# Patient Record
Sex: MALE
[De-identification: names, ages, dates, MRNs, and addresses within clinical notes are randomized per-mention and may not be internally consistent; named-entity substitution may affect disease eponyms.]

---

## 2019-06-07 PROBLEM — Z00.00 ENCOUNTER FOR PREVENTIVE HEALTH EXAMINATION: Status: ACTIVE | Noted: 2019-06-07

## 2019-06-13 ENCOUNTER — APPOINTMENT (OUTPATIENT)
Dept: OTOLARYNGOLOGY | Facility: CLINIC | Age: 34
End: 2019-06-13
Payer: MEDICAID

## 2019-06-13 VITALS
WEIGHT: 165 LBS | TEMPERATURE: 98 F | HEART RATE: 65 BPM | DIASTOLIC BLOOD PRESSURE: 76 MMHG | SYSTOLIC BLOOD PRESSURE: 118 MMHG | HEIGHT: 69 IN | BODY MASS INDEX: 24.44 KG/M2 | OXYGEN SATURATION: 95 %

## 2019-06-13 DIAGNOSIS — Z87.09 PERSONAL HISTORY OF OTHER DISEASES OF THE RESPIRATORY SYSTEM: ICD-10-CM

## 2019-06-13 DIAGNOSIS — Z78.9 OTHER SPECIFIED HEALTH STATUS: ICD-10-CM

## 2019-06-13 DIAGNOSIS — F17.210 NICOTINE DEPENDENCE, CIGARETTES, UNCOMPLICATED: ICD-10-CM

## 2019-06-13 DIAGNOSIS — Z83.3 FAMILY HISTORY OF DIABETES MELLITUS: ICD-10-CM

## 2019-06-13 DIAGNOSIS — H61.23 IMPACTED CERUMEN, BILATERAL: ICD-10-CM

## 2019-06-13 PROCEDURE — 92550 TYMPANOMETRY & REFLEX THRESH: CPT

## 2019-06-13 PROCEDURE — 92557 COMPREHENSIVE HEARING TEST: CPT

## 2019-06-13 PROCEDURE — 69210 REMOVE IMPACTED EAR WAX UNI: CPT

## 2019-06-13 PROCEDURE — 99204 OFFICE O/P NEW MOD 45 MIN: CPT | Mod: 25

## 2019-06-13 NOTE — HISTORY OF PRESENT ILLNESS
[de-identified] : for 2 1/2 years he's had a sense of rocking with walking or moving his head; no spinning. The L ear also seems clogged; occas L only nonpulsatile tinnitus. Pressure in the L ear when he swallows; he also feels like the L side of his face feels funny. Reports hearing & balance tests (VNG?) 2 y/a: was told that his L inner ear only functions at 30% & was given exercises and vestibular tx; his neurologist told him to have an MRI which is pending. No other imaging. \par Denies qtips use. No hx ear surg or drg. 3/10 L ear pain that comes & goes. \par Had a septo several years ago w/ some improvement; known resultant perf.

## 2019-06-13 NOTE — PHYSICAL EXAM
[Binocular Microscopic Exam] : Binocular microscopic exam was performed [Normal] : no rashes [FreeTextEntry8] : obstructing cerumen removed with a loop [FreeTextEntry9] : obstructing cerumen removed with a loop [de-identified] : large anterior septal perf [de-identified] : EOMI/PERRL w/ no resting nystagmus; CN 2-12 intact; good smooth pursuit;  unremarkable gait

## 2019-06-18 ENCOUNTER — APPOINTMENT (OUTPATIENT)
Dept: OTOLARYNGOLOGY | Facility: CLINIC | Age: 34
End: 2019-06-18
Payer: MEDICAID

## 2019-06-18 VITALS
WEIGHT: 165 LBS | BODY MASS INDEX: 24.44 KG/M2 | SYSTOLIC BLOOD PRESSURE: 122 MMHG | DIASTOLIC BLOOD PRESSURE: 76 MMHG | HEIGHT: 69 IN | OXYGEN SATURATION: 95 % | HEART RATE: 66 BPM | TEMPERATURE: 98.1 F

## 2019-06-18 DIAGNOSIS — R42 DIZZINESS AND GIDDINESS: ICD-10-CM

## 2019-06-18 DIAGNOSIS — H93.12 TINNITUS, LEFT EAR: ICD-10-CM

## 2019-06-18 PROCEDURE — 99214 OFFICE O/P EST MOD 30 MIN: CPT

## 2019-06-18 PROCEDURE — 92540 BASIC VESTIBULAR EVALUATION: CPT

## 2019-06-18 PROCEDURE — 92537 CALORIC VSTBLR TEST W/REC: CPT

## 2019-06-18 NOTE — HISTORY OF PRESENT ILLNESS
[de-identified] : f/u for 2 1/2 years of a sense of rocking with walking or moving his head; no spinning. The L ear also seems clogged; occas L only nonpulsatile tinnitus. Pressure in the L ear when he swallows; he also feels like the L side of his face feels funny. Reports hearing & balance tests (VNG?) 2 y/a: was told that his L inner ear "only functions at 30%" & was given exercises and vestibular tx; his neurologist told him to have an MRI which is being done later today. Now f/u a VNG. \par Denies qtips use. No hx ear surg or drg. 3/10 L ear pain that comes & goes. \par Had a septo several years ago w/ some improvement; known resultant perf.

## 2019-06-18 NOTE — PHYSICAL EXAM
[de-identified] : large anterior septal perf [de-identified] : EOMI/PERRL w/ no resting nystagmus; CN 2-12 intact; good smooth pursuit;  unremarkable gait [Normal] : right middle ear normal

## 2019-06-18 NOTE — ASSESSMENT
[FreeTextEntry1] : Agree w/ MRI; explained that his inner ears appear to be functioning well. f/u as per his neurologist.